# Patient Record
Sex: MALE | Race: AMERICAN INDIAN OR ALASKA NATIVE | NOT HISPANIC OR LATINO | URBAN - METROPOLITAN AREA
[De-identification: names, ages, dates, MRNs, and addresses within clinical notes are randomized per-mention and may not be internally consistent; named-entity substitution may affect disease eponyms.]

---

## 2020-12-28 ENCOUNTER — APPOINTMENT (RX ONLY)
Dept: URBAN - METROPOLITAN AREA CLINIC 97 | Facility: CLINIC | Age: 55
Setting detail: DERMATOLOGY
End: 2020-12-28

## 2020-12-28 DIAGNOSIS — S0182XA OPEN WOUND OF OTHER AND MULTIPLE SITES OF FACE, COMPLICATED: ICD-10-CM

## 2020-12-28 DIAGNOSIS — D22 MELANOCYTIC NEVI: ICD-10-CM

## 2020-12-28 PROBLEM — D22.5 MELANOCYTIC NEVI OF TRUNK: Status: ACTIVE | Noted: 2020-12-28

## 2020-12-28 PROBLEM — S21.201A UNSPECIFIED OPEN WOUND OF RIGHT BACK WALL OF THORAX WITHOUT PENETRATION INTO THORACIC CAVITY, INITIAL ENCOUNTER: Status: ACTIVE | Noted: 2020-12-28

## 2020-12-28 PROCEDURE — ? PRESCRIPTION

## 2020-12-28 PROCEDURE — ? COUNSELING

## 2020-12-28 PROCEDURE — 99202 OFFICE O/P NEW SF 15 MIN: CPT

## 2020-12-28 RX ORDER — CIPROFLOXACIN HYDROCHLORIDE 500 MG/1
TABLET, FILM COATED ORAL
Qty: 14 | Refills: 0 | COMMUNITY
Start: 2020-12-28

## 2020-12-28 RX ADMIN — CIPROFLOXACIN HYDROCHLORIDE: 500 TABLET, FILM COATED ORAL at 00:00

## 2020-12-28 ASSESSMENT — LOCATION SIMPLE DESCRIPTION DERM
LOCATION SIMPLE: RIGHT UPPER BACK
LOCATION SIMPLE: LEFT UPPER BACK

## 2020-12-28 ASSESSMENT — LOCATION DETAILED DESCRIPTION DERM
LOCATION DETAILED: LEFT SUPERIOR MEDIAL UPPER BACK
LOCATION DETAILED: RIGHT MID-UPPER BACK

## 2020-12-28 ASSESSMENT — LOCATION ZONE DERM: LOCATION ZONE: TRUNK

## 2020-12-28 NOTE — HPI: SKIN LESION
How Severe Is Your Skin Lesion?: mild
Has Your Skin Lesion Been Treated?: been treated
Is This A New Presentation, Or A Follow-Up?: Skin Lesion
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  Pt unsure of the severity of result
When Was It Treated?: October

## 2024-09-12 ENCOUNTER — APPOINTMENT (OUTPATIENT)
Dept: NEUROLOGY | Facility: CLINIC | Age: 59
End: 2024-09-12
Payer: COMMERCIAL

## 2024-09-12 VITALS
WEIGHT: 305 LBS | HEIGHT: 69 IN | SYSTOLIC BLOOD PRESSURE: 119 MMHG | HEART RATE: 70 BPM | BODY MASS INDEX: 45.18 KG/M2 | DIASTOLIC BLOOD PRESSURE: 80 MMHG

## 2024-09-12 DIAGNOSIS — R51.9 HEADACHE, UNSPECIFIED: ICD-10-CM

## 2024-09-12 PROBLEM — Z00.00 ENCOUNTER FOR PREVENTIVE HEALTH EXAMINATION: Status: ACTIVE | Noted: 2024-09-12

## 2024-09-12 PROCEDURE — 99203 OFFICE O/P NEW LOW 30 MIN: CPT

## 2024-09-12 NOTE — HISTORY OF PRESENT ILLNESS
[FreeTextEntry1] : The patient, Mr. Wall, presents with a new onset headache that began on August 19th. He describes the headache as a squeezing type of pressure, occasionally accompanied by pain under the ears. He does not typically experience headaches and has a history of allergies. He visited the emergency room and underwent a CT scan, which showed no abnormalities. The patient reports no neck pain, changes in sleep, diet, environmental exposure, or increased stress around the time the headaches began. His father had a glioblastoma 14 years ago, which raises his concern. The headaches have subsided for the past week following the emergency room visit.

## 2024-09-12 NOTE — ASSESSMENT
[FreeTextEntry1] : Acute onset headache-etiology unclear, but has resolved completely and his CAT scan of the head was normal - CT of the head result reviewed, no concerning finding -Patient advised to keep a log if his headache were to come back to discover possible triggers - Since patient is currently asymptomatic and does not require intervention, can return on as-needed basis

## 2025-07-17 ENCOUNTER — APPOINTMENT (OUTPATIENT)
Facility: CLINIC | Age: 60
End: 2025-07-17